# Patient Record
Sex: MALE | Race: WHITE | NOT HISPANIC OR LATINO | URBAN - NONMETROPOLITAN AREA
[De-identification: names, ages, dates, MRNs, and addresses within clinical notes are randomized per-mention and may not be internally consistent; named-entity substitution may affect disease eponyms.]

---

## 2017-02-01 RX ORDER — GABAPENTIN 100 MG/1
100 CAPSULE ORAL 2 TIMES DAILY
COMMUNITY

## 2017-02-01 RX ORDER — ERGOCALCIFEROL 1.25 MG/1
50000 CAPSULE ORAL WEEKLY
COMMUNITY

## 2017-02-01 RX ORDER — VALSARTAN 160 MG/1
320 TABLET ORAL DAILY
COMMUNITY

## 2017-02-02 ENCOUNTER — OFFICE VISIT (OUTPATIENT)
Dept: NEUROSURGERY | Facility: CLINIC | Age: 42
End: 2017-02-02

## 2017-02-02 VITALS
SYSTOLIC BLOOD PRESSURE: 160 MMHG | WEIGHT: 315 LBS | HEART RATE: 69 BPM | DIASTOLIC BLOOD PRESSURE: 111 MMHG | OXYGEN SATURATION: 97 % | RESPIRATION RATE: 16 BRPM | HEIGHT: 73 IN | TEMPERATURE: 97.4 F | BODY MASS INDEX: 41.75 KG/M2

## 2017-02-02 DIAGNOSIS — G89.29 CHRONIC BILATERAL LOW BACK PAIN WITHOUT SCIATICA: Primary | ICD-10-CM

## 2017-02-02 DIAGNOSIS — M54.50 CHRONIC BILATERAL LOW BACK PAIN WITHOUT SCIATICA: Primary | ICD-10-CM

## 2017-02-02 DIAGNOSIS — M48.061 SPINAL STENOSIS, LUMBAR REGION, WITHOUT NEUROGENIC CLAUDICATION: ICD-10-CM

## 2017-02-02 DIAGNOSIS — M47.817 LUMBOSACRAL SPONDYLOSIS WITHOUT MYELOPATHY: ICD-10-CM

## 2017-02-02 PROCEDURE — 99244 OFF/OP CNSLTJ NEW/EST MOD 40: CPT | Performed by: NEUROLOGICAL SURGERY

## 2017-02-02 RX ORDER — DILTIAZEM HYDROCHLORIDE 300 MG/1
300 CAPSULE, COATED, EXTENDED RELEASE ORAL DAILY
COMMUNITY

## 2017-02-02 NOTE — PROGRESS NOTES
Patient: Ferdinand Galelgos  :  1975  Chart #:  [id number]    Date of Service: 17    Chief Complaint:   Chief Complaint   Patient presents with   • Back Pain       Back Pain   Chronicity: Patient is new with me today.  He is a pleasant 41 year old male who presents today with lumbar pain. The current episode started more than 1 year ago (He is a  and he has had a long history of back pain.). The problem occurs constantly (He was diagnosed in  with arthritis.). The problem has been gradually worsening since onset. The pain is present in the lumbar spine. Quality: Patient has some bilateral leg pain.  He has more pain however in his feet bilaterally. The pain is at a severity of 9/10. The pain is the same all the time. The symptoms are aggravated by standing and twisting (He states his pain increases when he is ambulatoy.). He has tried bed rest for the symptoms. The treatment provided mild relief.     He has a long hx of low back pain;  He is painful with standing and walking and comfortable sitting and lying down.  He has never had spine surgery;  He takes vimovo which has helped.  He is working.      Radiographic Images:   MRI of the lumbar spine dated 16 shows significant degenerative disc disease at L2-L3.  He has type II modic changes  He has dessication at L1-L2, L2-L3, L4-L5 and L5-S1.  He has a narrow spinal canal.  There is a small annual ar tear at L4-L5.  There is no disc herniation and his alignment is normal.     Past Medical History   Diagnosis Date   • Arthritis    • Bronchitis    • Hypertension    • Hypogonadism in male    • Low back pain    • Vitamin D deficiency disease      Current Outpatient Prescriptions   Medication Sig Dispense Refill   • diltiaZEM CD (CARDIZEM CD) 300 MG 24 hr capsule Take 300 mg by mouth Daily.     • gabapentin (NEURONTIN) 100 MG capsule Take 100 mg by mouth 2 (Two) Times a Day.     • Naproxen-Esomeprazole (VIMOVO) 500-20 MG tablet  "delayed-release Take  by mouth.     • valsartan (DIOVAN) 160 MG tablet Take 320 mg by mouth Daily.     • vitamin D (ERGOCALCIFEROL) 62094 UNITS capsule capsule Take 50,000 Units by mouth 1 (One) Time Per Week.       No current facility-administered medications for this visit.      Social History     Social History   • Marital status: Unknown     Spouse name: N/A   • Number of children: N/A   • Years of education: N/A     Social History Main Topics   • Smoking status: Former Smoker   • Smokeless tobacco: Never Used   • Alcohol use Yes   • Drug use: No   • Sexual activity: Defer     Other Topics Concern   • None     Social History Narrative     Family History   Problem Relation Age of Onset   • Hypertension Mother    • Heart disease Father    • Hypertension Father    • Hypertension Brother    • Heart disease Maternal Grandfather      History reviewed. No pertinent past surgical history.  Review of Systems   Musculoskeletal: Positive for back pain and neck pain.   All other systems reviewed and are negative.    Vitals:    02/02/17 1238   BP: (!) 160/111   BP Location: Right arm   Patient Position: Sitting   Pulse: 69   Resp: 16   Temp: 97.4 °F (36.3 °C)   SpO2: 97%   Weight: (!) 325 lb (147 kg)   Height: 73\" (185.4 cm)     Physical Exam  Neurologic Exam  Physical Exam   Constitutional: He is oriented to person, place, and time. Vital signs are normal. He appears well-developed and well-nourished. No distress.   Neat obese o/w  healthy male    HENT:   Head: Normocephalic.   Right Ear: Hearing normal.   Left Ear: Hearing normal.   Mouth/Throat: Uvula is midline, oropharynx is clear and moist and mucous membranes are normal.   Eyes: Conjunctivae, EOM and lids are normal. Pupils are equal, round, and reactive to light.   Fundoscopic exam:  The right eye shows no papilledema.   The left eye shows no papilledema.   Pupils 2 mm; Fundi normal   Neck: Trachea normal and normal range of motion. No thyroid mass present. "   Pulses:  Carotid pulses are 2+ on the right side, and 2+ on the left side.  Radial pulses are 2+ on the right side, and 2+ on the left side.   Dorsalis pedis pulses are 2+ on the right side, and 2+ on the left side.      Musculoskeletal:   Lumbar back: He exhibits pain. He exhibits normal range of motion, no deformity and no spasm.   Mild stiffness; SLR increased low back  pain; Hip ROM normal        Neurologic Exam      Mental Status   Oriented to person, place, and time.   Attention: normal. Concentration: normal.   Speech: speech is normal   Level of consciousness: alert  Knowledge: good and consistent with education.   Normal comprehension.      Cranial Nerves   Cranial nerves II through XII intact.      CN III, IV, VI   Pupils are equal, round, and reactive to light.  Extraocular motions are normal.      Motor Exam   Muscle bulk: normal  Overall muscle tone: normal  No Pronator Drift    Strength   Strength 5/5 throughout.      Sensory Exam   Light touch normal.   Proprioception normal.      Gait, Coordination, and Reflexes      Gait  Gait: normal     Tremor   Resting tremor: absent  Intention tremor: absent  Action tremor: absent     Reflexes   Right biceps: 2+  Left biceps: 2+  Right triceps: 2+  Left triceps: 2+  Right patellar: 2+  Left patellar: 2+  Right achilles: 1+  Left achilles: 1+  No Babinski signs  Right Shrestha: absent  Left Shrestha: absent  Right ankle clonus: absent  Left ankle clonus: absent  HERNANDEZ normal; R handed      Ferdinand was seen today for back pain.    Diagnoses and all orders for this visit:    Chronic bilateral low back pain without sciatica    Lumbosacral spondylosis without myelopathy    Spinal stenosis, lumbar region, without neurogenic claudication      Plan: Continue vimovo;  Daily low impact aerobic exercise;  I don't recommend any spine surgery at this time.  I have discussed this with the patient in detail.  I will see him prn if he develops more back problems or radicular leg  symptoms.      Dru Guevara MD

## 2021-07-01 ENCOUNTER — TRANSCRIBE ORDERS (OUTPATIENT)
Dept: ADMINISTRATIVE | Facility: HOSPITAL | Age: 46
End: 2021-07-01

## 2021-12-31 ENCOUNTER — HOSPITAL ENCOUNTER (INPATIENT)
Dept: HOSPITAL 79 - ER1 | Age: 46
LOS: 3 days | Discharge: ANOTHER HOSPITAL | DRG: 177 | End: 2022-01-03
Attending: INTERNAL MEDICINE | Admitting: INTERNAL MEDICINE
Payer: COMMERCIAL

## 2021-12-31 VITALS — HEIGHT: 73 IN | BODY MASS INDEX: 41.75 KG/M2 | WEIGHT: 315 LBS

## 2021-12-31 DIAGNOSIS — Z82.3: ICD-10-CM

## 2021-12-31 DIAGNOSIS — Z82.49: ICD-10-CM

## 2021-12-31 DIAGNOSIS — Z79.01: ICD-10-CM

## 2021-12-31 DIAGNOSIS — I10: ICD-10-CM

## 2021-12-31 DIAGNOSIS — M10.9: ICD-10-CM

## 2021-12-31 DIAGNOSIS — G47.33: ICD-10-CM

## 2021-12-31 DIAGNOSIS — M47.899: ICD-10-CM

## 2021-12-31 DIAGNOSIS — U07.1: Primary | ICD-10-CM

## 2021-12-31 DIAGNOSIS — Z79.82: ICD-10-CM

## 2021-12-31 DIAGNOSIS — M19.09: ICD-10-CM

## 2021-12-31 DIAGNOSIS — Z99.81: ICD-10-CM

## 2021-12-31 DIAGNOSIS — E66.9: ICD-10-CM

## 2021-12-31 DIAGNOSIS — J80: ICD-10-CM

## 2021-12-31 DIAGNOSIS — J12.82: ICD-10-CM

## 2021-12-31 DIAGNOSIS — Z82.0: ICD-10-CM

## 2021-12-31 DIAGNOSIS — Z86.73: ICD-10-CM

## 2021-12-31 LAB
BUN/CREATININE RATIO: 33 (ref 0–10)
HGB BLD-MCNC: 16.8 GM/DL (ref 14–17.5)
RED BLOOD COUNT: 5.54 M/UL (ref 4.2–5.5)
WHITE BLOOD COUNT: 24.2 K/UL (ref 4.5–11)

## 2021-12-31 PROCEDURE — U0002 COVID-19 LAB TEST NON-CDC: HCPCS

## 2021-12-31 PROCEDURE — 3E0333Z INTRODUCTION OF ANTI-INFLAMMATORY INTO PERIPHERAL VEIN, PERCUTANEOUS APPROACH: ICD-10-PCS | Performed by: INTERNAL MEDICINE

## 2021-12-31 PROCEDURE — XW033E5 INTRODUCTION OF REMDESIVIR ANTI-INFECTIVE INTO PERIPHERAL VEIN, PERCUTANEOUS APPROACH, NEW TECHNOLOGY GROUP 5: ICD-10-PCS | Performed by: INTERNAL MEDICINE

## 2022-01-01 LAB
BUN/CREATININE RATIO: 34 (ref 0–10)
HGB BLD-MCNC: 15.7 GM/DL (ref 14–17.5)
RED BLOOD COUNT: 5.18 M/UL (ref 4.2–5.5)
WHITE BLOOD COUNT: 20.5 K/UL (ref 4.5–11)

## 2022-01-01 PROCEDURE — 5A0935A ASSISTANCE WITH RESPIRATORY VENTILATION, LESS THAN 24 CONSECUTIVE HOURS, HIGH NASAL FLOW/VELOCITY: ICD-10-PCS | Performed by: INTERNAL MEDICINE

## 2022-01-02 LAB
BUN/CREATININE RATIO: 37 (ref 0–10)
HGB BLD-MCNC: 15.9 GM/DL (ref 14–17.5)
RED BLOOD COUNT: 5.33 M/UL (ref 4.2–5.5)
WHITE BLOOD COUNT: 18.6 K/UL (ref 4.5–11)

## 2022-01-02 PROCEDURE — 5A09357 ASSISTANCE WITH RESPIRATORY VENTILATION, LESS THAN 24 CONSECUTIVE HOURS, CONTINUOUS POSITIVE AIRWAY PRESSURE: ICD-10-PCS | Performed by: INTERNAL MEDICINE

## 2022-01-03 PROCEDURE — 8E0ZXY6 ISOLATION: ICD-10-PCS | Performed by: INTERNAL MEDICINE
